# Patient Record
Sex: MALE | Race: OTHER | NOT HISPANIC OR LATINO
[De-identification: names, ages, dates, MRNs, and addresses within clinical notes are randomized per-mention and may not be internally consistent; named-entity substitution may affect disease eponyms.]

---

## 2022-04-07 DIAGNOSIS — Z72.89 OTHER PROBLEMS RELATED TO LIFESTYLE: ICD-10-CM

## 2022-04-07 DIAGNOSIS — Z87.891 PERSONAL HISTORY OF NICOTINE DEPENDENCE: ICD-10-CM

## 2022-04-07 DIAGNOSIS — J45.909 UNSPECIFIED ASTHMA, UNCOMPLICATED: ICD-10-CM

## 2022-04-07 DIAGNOSIS — Z82.49 FAMILY HISTORY OF ISCHEMIC HEART DISEASE AND OTHER DISEASES OF THE CIRCULATORY SYSTEM: ICD-10-CM

## 2022-04-07 PROBLEM — Z00.00 ENCOUNTER FOR PREVENTIVE HEALTH EXAMINATION: Status: ACTIVE | Noted: 2022-04-07

## 2022-04-07 RX ORDER — ALBUTEROL SULFATE 90 UG/1
108 (90 BASE) AEROSOL, METERED RESPIRATORY (INHALATION)
Refills: 0 | Status: ACTIVE | COMMUNITY

## 2022-04-18 ENCOUNTER — NON-APPOINTMENT (OUTPATIENT)
Age: 52
End: 2022-04-18

## 2022-04-18 ENCOUNTER — APPOINTMENT (OUTPATIENT)
Dept: UROLOGY | Facility: CLINIC | Age: 52
End: 2022-04-18
Payer: COMMERCIAL

## 2022-04-18 PROCEDURE — 93980 PENILE VASCULAR STUDY: CPT

## 2022-04-18 PROCEDURE — 54235 NJX CORPORA CAVERNOSA RX AGT: CPT

## 2022-05-02 ENCOUNTER — APPOINTMENT (OUTPATIENT)
Dept: UROLOGY | Facility: CLINIC | Age: 52
End: 2022-05-02

## 2022-05-16 ENCOUNTER — APPOINTMENT (OUTPATIENT)
Dept: UROLOGY | Facility: CLINIC | Age: 52
End: 2022-05-16
Payer: COMMERCIAL

## 2022-05-16 VITALS
SYSTOLIC BLOOD PRESSURE: 150 MMHG | BODY MASS INDEX: 29.4 KG/M2 | TEMPERATURE: 98 F | HEIGHT: 71 IN | DIASTOLIC BLOOD PRESSURE: 98 MMHG | WEIGHT: 210 LBS

## 2022-05-16 PROCEDURE — 99213 OFFICE O/P EST LOW 20 MIN: CPT

## 2022-05-16 NOTE — ASSESSMENT
[FreeTextEntry1] : The patient is not interested in penile injection therapy.  He has now failed maximum dose oral therapy.  At this point he request to have a penile implant.  This is indeed his best option however it will require grafting with arthroflex.\par \par The summary points of our discussion after the Duplex test are that:\par 1) The results of the Duplex study were reviewed with the patient and that his ailment is organic erectile dysfunction, refractory to other treatments, or at least other options were offered but rejected.\par 2) The proposed treatment is the inflatable, permanent, penile prosthesis that we have discussed in great detail at outlined in our previous discussion.\par 3) The probability of success is over 90%, but this depends on whether or not there are complications.  The complications can be very serious and certainly can occur, including infection, breakage, and injury to surrounding structures.  These events would quality as a "failure", and he understands this completely.\par 4) The risks are outlined above, but infection is 1-2% in the best circumstances, the chance of other events happening is low but possible, including urethral perforation, bladder perforation, or device breakage.\par With all of this stated, he decided in light of all of this discussion and different treatment options available, he would like to move forward with 3 piece inflatable penile prosthesis placement.  He was informed that penile implant surgery is an end of the line therapy, and once this is undertaken, one cannot go back and attempt to use injections or pills and expect these to work should the implant be removed for infection or fail to be satisfactory. He understands that he will need to be medically cleared before proceeding with scheduling a dated for the procedure.\par \par \par \par

## 2022-05-16 NOTE — HISTORY OF PRESENT ILLNESS
[FreeTextEntry1] : The patient is back to report on a trial of daily tadalafil.  He was not satisfied with the result.  He continues to complain of the pronounced curvature.  Penile duplex ultrasonography in the past revealed that he has a 45 degree curvature as well as a downward bend of the distal half of the penile shaft.  He also has an hourglass deformity.

## 2022-07-21 ENCOUNTER — APPOINTMENT (OUTPATIENT)
Dept: UROLOGY | Facility: CLINIC | Age: 52
End: 2022-07-21

## 2022-07-21 VITALS
SYSTOLIC BLOOD PRESSURE: 142 MMHG | BODY MASS INDEX: 28.44 KG/M2 | WEIGHT: 203.13 LBS | TEMPERATURE: 97.9 F | HEIGHT: 71 IN | DIASTOLIC BLOOD PRESSURE: 84 MMHG

## 2022-07-21 DIAGNOSIS — N40.1 BENIGN PROSTATIC HYPERPLASIA WITH LOWER URINARY TRACT SYMPMS: ICD-10-CM

## 2022-07-21 DIAGNOSIS — R39.14 BENIGN PROSTATIC HYPERPLASIA WITH LOWER URINARY TRACT SYMPMS: ICD-10-CM

## 2022-07-21 DIAGNOSIS — N48.89 OTHER SPECIFIED DISORDERS OF PENIS: ICD-10-CM

## 2022-07-21 PROCEDURE — 99215 OFFICE O/P EST HI 40 MIN: CPT | Mod: 25

## 2022-07-21 PROCEDURE — 51741 ELECTRO-UROFLOWMETRY FIRST: CPT

## 2022-07-21 PROCEDURE — 52000 CYSTOURETHROSCOPY: CPT

## 2022-07-21 PROCEDURE — 51725 SIMPLE CYSTOMETROGRAM: CPT

## 2022-07-21 PROCEDURE — 51798 US URINE CAPACITY MEASURE: CPT | Mod: 59

## 2022-07-21 NOTE — ASSESSMENT
[FreeTextEntry1] : CYSTOMETROGRAM:\par \par Preop Diagnosis: Increased Frequency of urination. \par Postop Diagnosis: Increased Frequency of urination.\par Anesthesia: 2 % Intraurethral Lidocaine Jelly \par Medication: Ciprofloxacin\par Complications: None \par Operative Note: \par - The patient was placed in the dorsal lithotomy position and draped in the usual standard sterile fashion with surgical Betadine soap and wash. 2 % lidocaine jelly was inserted intraurethrally via the meatus. A clamp was applied to the penis and lidocaine jelly was allowed to remain in place for 5 minutes. \par \par The meatus was then intubated with a 16F flexible cystocope. Visual cystourethroscopic examination was initiated under sterile water irrigation. The findings observed are described in the cryoscopy report. \par \par With the flexible cystocope indwelling into the bladder, a sterile line of intravenous saline was connected into a manometer. This allowed us to measure the amount of fluid, pressure, and flow of the lower urinary tract. The following findings of the cystometrogram were noted. \par \par Bladder wall compliance: Normal\par Functional bladder capacity:    \par The patient perceived a first sensation that the bladder was filling with saline at:     cc\par His first urge to void was observed at    cc of saline. \par His post void residual was measured at     cc\par Bladder Scan and his flow rate peaked at 48 cc/min \par His proprioception was normal: normal \par Detrusor instability was: Not present \par Summary: \par The patient tolerated the procedure well. He was discharged to home on oral antibiotics to call if he develop difficulty with urination, bleeding, fever or chills.\par \par BLADDER SCAN:\par Indication: Increased frequency of urination. \par Initial Volume:    cc\par PVR:   cc\par Results: Stress urinary incontinence. \par Recommendations: No Therapy Needed.\par \par URO FLOWMETRY:\par Indication: Increased frequency of urination. \par Urinary Flow Rate:    m/s\par Results: Stress urinary incontinence    \par Recommendations: No therapy needed.\par

## 2022-07-21 NOTE — HISTORY OF PRESENT ILLNESS
[FreeTextEntry1] : The patient is here for his preoperative visit prior to insertion of penile implant he is also here for evaluation of voiding function.  Patient complains of frequency of urination.

## 2022-08-15 RX ORDER — GENTAMICIN SULFATE 40 MG/ML
320 VIAL (ML) INJECTION ONCE
Refills: 0 | Status: DISCONTINUED | OUTPATIENT
Start: 2022-08-16 | End: 2022-08-16

## 2022-08-15 RX ORDER — SODIUM CHLORIDE 9 MG/ML
1000 INJECTION, SOLUTION INTRAVENOUS
Refills: 0 | Status: DISCONTINUED | OUTPATIENT
Start: 2022-08-15 | End: 2022-08-16

## 2022-08-15 RX ORDER — CHLORHEXIDINE GLUCONATE 213 G/1000ML
1 SOLUTION TOPICAL DAILY
Refills: 0 | Status: DISCONTINUED | OUTPATIENT
Start: 2022-08-16 | End: 2022-08-16

## 2022-08-15 NOTE — ASU PATIENT PROFILE, ADULT - NSICDXPASTMEDICALHX_GEN_ALL_CORE_FT
PAST MEDICAL HISTORY:  Asthma     H/O chest pain more than a year ago    HTN (hypertension)      PAST MEDICAL HISTORY:  Asthma     H/O chest pain more than a year ago    HTN (hypertension)     Hyperlipidemia

## 2022-08-15 NOTE — ASU PATIENT PROFILE, ADULT - FALL HARM RISK - UNIVERSAL INTERVENTIONS
Bed in lowest position, wheels locked, appropriate side rails in place/Call bell, personal items and telephone in reach/Instruct patient to call for assistance before getting out of bed or chair/Non-slip footwear when patient is out of bed/[place] to call system/Physically safe environment - no spills, clutter or unnecessary equipment/Purposeful Proactive Rounding/Room/bathroom lighting operational, light cord in reach

## 2022-08-16 ENCOUNTER — OUTPATIENT (OUTPATIENT)
Dept: OUTPATIENT SERVICES | Facility: HOSPITAL | Age: 52
LOS: 1 days | Discharge: HOME | End: 2022-08-16

## 2022-08-16 ENCOUNTER — TRANSCRIPTION ENCOUNTER (OUTPATIENT)
Age: 52
End: 2022-08-16

## 2022-08-16 ENCOUNTER — APPOINTMENT (OUTPATIENT)
Dept: UROLOGY | Facility: AMBULATORY SURGERY CENTER | Age: 52
End: 2022-08-16

## 2022-08-16 VITALS
WEIGHT: 207.23 LBS | HEART RATE: 87 BPM | HEIGHT: 71 IN | SYSTOLIC BLOOD PRESSURE: 148 MMHG | OXYGEN SATURATION: 98 % | RESPIRATION RATE: 15 BRPM | TEMPERATURE: 99 F | DIASTOLIC BLOOD PRESSURE: 97 MMHG

## 2022-08-16 VITALS
RESPIRATION RATE: 15 BRPM | DIASTOLIC BLOOD PRESSURE: 92 MMHG | SYSTOLIC BLOOD PRESSURE: 130 MMHG | OXYGEN SATURATION: 98 % | HEART RATE: 79 BPM | TEMPERATURE: 97 F

## 2022-08-16 DIAGNOSIS — Z90.49 ACQUIRED ABSENCE OF OTHER SPECIFIED PARTS OF DIGESTIVE TRACT: Chronic | ICD-10-CM

## 2022-08-16 PROCEDURE — 11952 SUBQ NJX FIL MATRL 5.1-10CC: CPT | Mod: GC

## 2022-08-16 PROCEDURE — 11952 SUBQ NJX FIL MATRL 5.1-10CC: CPT | Mod: AS

## 2022-08-16 PROCEDURE — 54360 PENIS PLASTIC SURGERY: CPT | Mod: GC

## 2022-08-16 PROCEDURE — 15574 PEDCLE FH/CH/CH/M/N/AX/G/H/F: CPT | Mod: AS

## 2022-08-16 PROCEDURE — 54405 INSERT MULTI-COMP PENIS PROS: CPT | Mod: GC

## 2022-08-16 PROCEDURE — 15574 PEDCLE FH/CH/CH/M/N/AX/G/H/F: CPT | Mod: GC

## 2022-08-16 PROCEDURE — 54360 PENIS PLASTIC SURGERY: CPT | Mod: AS

## 2022-08-16 PROCEDURE — 15275 SKIN SUB GRAFT FACE/NK/HF/G: CPT | Mod: AS

## 2022-08-16 PROCEDURE — 15275 SKIN SUB GRAFT FACE/NK/HF/G: CPT | Mod: GC

## 2022-08-16 PROCEDURE — 54405 INSERT MULTI-COMP PENIS PROS: CPT | Mod: AS

## 2022-08-16 DEVICE — SURGIFLO HEMOSTATIC MATRIX KIT: Type: IMPLANTABLE DEVICE | Status: FUNCTIONAL

## 2022-08-16 DEVICE — RESERVIOR TITAN CLOVERLEAF 125CC: Type: IMPLANTABLE DEVICE | Status: FUNCTIONAL

## 2022-08-16 DEVICE — KIT PENILE TITAN ASSEMBLY STANDARD: Type: IMPLANTABLE DEVICE | Status: FUNCTIONAL

## 2022-08-16 DEVICE — IMP PENILE TITAN CYL PUMP SET SCROTAL 0 ANG 22CM: Type: IMPLANTABLE DEVICE | Status: FUNCTIONAL

## 2022-08-16 RX ORDER — AMLODIPINE BESYLATE AND BENAZEPRIL HYDROCHLORIDE 10; 20 MG/1; MG/1
1 CAPSULE ORAL
Qty: 0 | Refills: 0 | DISCHARGE

## 2022-08-16 RX ORDER — ONDANSETRON 8 MG/1
4 TABLET, FILM COATED ORAL ONCE
Refills: 0 | Status: DISCONTINUED | OUTPATIENT
Start: 2022-08-16 | End: 2022-08-16

## 2022-08-16 RX ORDER — ACETAMINOPHEN 500 MG
650 TABLET ORAL ONCE
Refills: 0 | Status: DISCONTINUED | OUTPATIENT
Start: 2022-08-16 | End: 2022-08-16

## 2022-08-16 RX ORDER — OXYCODONE HYDROCHLORIDE 5 MG/1
5 TABLET ORAL ONCE
Refills: 0 | Status: DISCONTINUED | OUTPATIENT
Start: 2022-08-16 | End: 2022-08-16

## 2022-08-16 RX ORDER — FENTANYL CITRATE 50 UG/ML
50 INJECTION INTRAVENOUS DAILY
Refills: 0 | Status: DISCONTINUED | OUTPATIENT
Start: 2022-08-16 | End: 2022-08-16

## 2022-08-16 RX ORDER — ALBUTEROL 90 UG/1
0 AEROSOL, METERED ORAL
Qty: 0 | Refills: 0 | DISCHARGE

## 2022-08-16 RX ORDER — VANCOMYCIN HCL 1 G
1500 VIAL (EA) INTRAVENOUS ONCE
Refills: 0 | Status: COMPLETED | OUTPATIENT
Start: 2022-08-16 | End: 2022-08-16

## 2022-08-16 RX ADMIN — SODIUM CHLORIDE 200 MILLILITER(S): 9 INJECTION, SOLUTION INTRAVENOUS at 07:03

## 2022-08-16 RX ADMIN — CHLORHEXIDINE GLUCONATE 1 APPLICATION(S): 213 SOLUTION TOPICAL at 06:49

## 2022-08-16 RX ADMIN — OXYCODONE HYDROCHLORIDE 5 MILLIGRAM(S): 5 TABLET ORAL at 17:06

## 2022-08-16 RX ADMIN — SODIUM CHLORIDE 200 MILLILITER(S): 9 INJECTION, SOLUTION INTRAVENOUS at 12:32

## 2022-08-16 RX ADMIN — OXYCODONE HYDROCHLORIDE 5 MILLIGRAM(S): 5 TABLET ORAL at 14:05

## 2022-08-16 RX ADMIN — OXYCODONE HYDROCHLORIDE 5 MILLIGRAM(S): 5 TABLET ORAL at 17:36

## 2022-08-16 RX ADMIN — FENTANYL CITRATE 50 MICROGRAM(S): 50 INJECTION INTRAVENOUS at 12:30

## 2022-08-16 RX ADMIN — FENTANYL CITRATE 50 MICROGRAM(S): 50 INJECTION INTRAVENOUS at 12:45

## 2022-08-16 RX ADMIN — OXYCODONE HYDROCHLORIDE 5 MILLIGRAM(S): 5 TABLET ORAL at 13:35

## 2022-08-16 RX ADMIN — Medication 150 MILLIGRAM(S): at 07:12

## 2022-08-16 NOTE — BRIEF OPERATIVE NOTE - NSICDXBRIEFPREOP_GEN_ALL_CORE_FT
PRE-OP DIAGNOSIS:  Erectile dysfunction 16-Aug-2022 12:25:57  Anthony Hummel  Penile pain 16-Aug-2022 12:26:15  Anthony Hummel  Hypertension 16-Aug-2022 12:26:23  Anthony Hummel

## 2022-08-16 NOTE — ASU DISCHARGE PLAN (ADULT/PEDIATRIC) - CARE PROVIDER_API CALL
Hao Giron)  Urology  [address] [place] [address]  Phone: [phone]  Fax: [phone]  Follow Up Time:

## 2022-08-16 NOTE — ASU DISCHARGE PLAN (ADULT/PEDIATRIC) - ASU DC SPECIAL INSTRUCTIONSFT
Strict bed rest for 48 hours.  Apply ice packs to surgical region.  Follow up with Dr. Giron on Monday.  See attached pre-printed instructions.

## 2022-08-16 NOTE — BRIEF OPERATIVE NOTE - NSICDXBRIEFPROCEDURE_GEN_ALL_CORE_FT
PROCEDURES:  Insertion, penile prosthesis, inflatable, multicomponent 16-Aug-2022 12:40:02  Anthony Hummel  Correction of malrotation of penis 16-Aug-2022 12:40:19  Anthony Hummel

## 2022-08-16 NOTE — BRIEF OPERATIVE NOTE - NSICDXBRIEFPOSTOP_GEN_ALL_CORE_FT
POST-OP DIAGNOSIS:  Erectile dysfunction 16-Aug-2022 12:26:37  Anthony Hummel  Penile pain 16-Aug-2022 12:26:46  Anthony Hummel  Hypertension 16-Aug-2022 12:26:58  Anthony Hummel

## 2022-08-18 ENCOUNTER — NON-APPOINTMENT (OUTPATIENT)
Age: 52
End: 2022-08-18

## 2022-08-22 PROBLEM — J45.909 UNSPECIFIED ASTHMA, UNCOMPLICATED: Chronic | Status: ACTIVE | Noted: 2022-08-15

## 2022-08-22 PROBLEM — E78.5 HYPERLIPIDEMIA, UNSPECIFIED: Chronic | Status: ACTIVE | Noted: 2022-08-16

## 2022-08-22 PROBLEM — I10 ESSENTIAL (PRIMARY) HYPERTENSION: Chronic | Status: ACTIVE | Noted: 2022-08-15

## 2022-08-22 PROBLEM — Z87.898 PERSONAL HISTORY OF OTHER SPECIFIED CONDITIONS: Chronic | Status: ACTIVE | Noted: 2022-08-15

## 2022-09-12 ENCOUNTER — APPOINTMENT (OUTPATIENT)
Dept: UROLOGY | Facility: CLINIC | Age: 52
End: 2022-09-12

## 2022-09-12 VITALS
DIASTOLIC BLOOD PRESSURE: 80 MMHG | SYSTOLIC BLOOD PRESSURE: 126 MMHG | TEMPERATURE: 96.8 F | BODY MASS INDEX: 29.4 KG/M2 | WEIGHT: 210 LBS | HEIGHT: 71 IN

## 2022-09-12 DIAGNOSIS — N48.89 OTHER SPECIFIED DISORDERS OF PENIS: ICD-10-CM

## 2022-09-12 DIAGNOSIS — Z01.818 ENCOUNTER FOR OTHER PREPROCEDURAL EXAMINATION: ICD-10-CM

## 2022-09-12 PROCEDURE — 99024 POSTOP FOLLOW-UP VISIT: CPT

## 2022-09-12 PROCEDURE — 51798 US URINE CAPACITY MEASURE: CPT

## 2022-09-12 NOTE — ASSESSMENT
[FreeTextEntry1] : BLADDER SCAN:\par Indication: Increased frequency of urination. \par Initial Volume:    cc\par PVR: 95  cc\par Results: Stress urinary incontinence. \par Recommendations: No Therapy Needed.\par \par SUTURE REMOVAL NOTE:\par Incision: Healing well, edges well approximated, no redness, swelling or drainage present. \par Drainage: No drainage present\par Number of sutures removed: 4\par Site appearance post procedure: clean intact\par Site care post procedure: site cleansed with alcohol swap with steri strips applied with edges approximated. \par Patient respond to procedure tolerated well without complication. \par Recommendation/ patient instructions: Follow up appointment. \par \par The patient is 30 days following insertion of of inflatable penile implant.  He is doing well.  He was provided today with appropriate postop instructions.  A 4 weeks postop assessment was discussed with the patient and he was provided with further information on how to inflate and deflate his device.  He was also provided with a brochure of the appropriate manufacture as well as a model of the scrotal pump.\par \par \par

## 2022-09-12 NOTE — HISTORY OF PRESENT ILLNESS
[FreeTextEntry1] : The patient is almost 4 weeks status post insertion of penile implant with correction of pedal angulation and grafting.  He has no complaints.

## 2022-12-05 ENCOUNTER — APPOINTMENT (OUTPATIENT)
Dept: UROLOGY | Facility: CLINIC | Age: 52
End: 2022-12-05

## 2022-12-05 VITALS
HEIGHT: 71 IN | TEMPERATURE: 98 F | DIASTOLIC BLOOD PRESSURE: 100 MMHG | BODY MASS INDEX: 29.4 KG/M2 | WEIGHT: 210 LBS | SYSTOLIC BLOOD PRESSURE: 180 MMHG

## 2022-12-05 DIAGNOSIS — N52.9 MALE ERECTILE DYSFUNCTION, UNSPECIFIED: ICD-10-CM

## 2022-12-05 DIAGNOSIS — Z48.816 ENCOUNTER FOR SURGICAL AFTERCARE FOLLOWING SURGERY ON THE GENITOURINARY SYSTEM: ICD-10-CM

## 2022-12-05 DIAGNOSIS — N48.89 OTHER SPECIFIED DISORDERS OF PENIS: ICD-10-CM

## 2022-12-05 DIAGNOSIS — I10 ESSENTIAL (PRIMARY) HYPERTENSION: ICD-10-CM

## 2022-12-05 DIAGNOSIS — N48.6 INDURATION PENIS PLASTICA: ICD-10-CM

## 2022-12-05 PROCEDURE — 51798 US URINE CAPACITY MEASURE: CPT

## 2022-12-05 PROCEDURE — 99214 OFFICE O/P EST MOD 30 MIN: CPT | Mod: 25

## 2022-12-05 NOTE — HISTORY OF PRESENT ILLNESS
[FreeTextEntry1] : 52M presenting status post insertion of penile implant with correction of penile angulation and grafting 8/16/22\par \par  He has no complaints currently. Doing well.\par Able to inflate and deflate the device without difficulty.\par Notes some outpouching from the proximal right shaft. Does not bother him or his partner.
None

## 2023-06-05 ENCOUNTER — APPOINTMENT (OUTPATIENT)
Dept: UROLOGY | Facility: CLINIC | Age: 53
End: 2023-06-05

## 2023-07-31 ENCOUNTER — APPOINTMENT (OUTPATIENT)
Dept: UROLOGY | Facility: CLINIC | Age: 53
End: 2023-07-31
Payer: COMMERCIAL

## 2023-07-31 VITALS
BODY MASS INDEX: 28.99 KG/M2 | TEMPERATURE: 98.3 F | SYSTOLIC BLOOD PRESSURE: 134 MMHG | HEIGHT: 72 IN | DIASTOLIC BLOOD PRESSURE: 88 MMHG | WEIGHT: 214 LBS

## 2023-07-31 DIAGNOSIS — Z96.0 PRESENCE OF UROGENITAL IMPLANTS: ICD-10-CM

## 2023-07-31 DIAGNOSIS — R35.0 FREQUENCY OF MICTURITION: ICD-10-CM

## 2023-07-31 PROCEDURE — 99213 OFFICE O/P EST LOW 20 MIN: CPT

## 2023-07-31 RX ORDER — PHENAZOPYRIDINE HCL 99.5 MG
TABLET ORAL
Refills: 0 | Status: ACTIVE | COMMUNITY

## 2023-07-31 RX ORDER — CIPROFLOXACIN HYDROCHLORIDE 500 MG/1
500 TABLET, FILM COATED ORAL
Qty: 28 | Refills: 0 | Status: ACTIVE | COMMUNITY
Start: 2023-07-31 | End: 1900-01-01

## 2023-07-31 RX ORDER — PUMPKIN SEED EXTRACT/SOY GERM 300 MG
CAPSULE ORAL
Refills: 0 | Status: ACTIVE | COMMUNITY

## 2023-08-02 PROBLEM — R35.0 FREQUENCY OF URINATION: Status: ACTIVE | Noted: 2022-07-21

## 2023-08-02 PROBLEM — Z96.0 S/P INSERTION OF PENILE IMPLANT: Status: ACTIVE | Noted: 2022-09-12

## 2023-08-02 NOTE — PHYSICAL EXAM
[General Appearance - Well Developed] : well developed [General Appearance - Well Nourished] : well nourished [Normal Appearance] : normal appearance [Well Groomed] : well groomed [General Appearance - In No Acute Distress] : no acute distress [Abdomen Tenderness] : non-tender [Abdomen Soft] : soft [Costovertebral Angle Tenderness] : no ~M costovertebral angle tenderness [Urethral Meatus] : meatus normal [Urinary Bladder Findings] : the bladder was normal on palpation [Scrotum] : the scrotum was normal [Testes Mass (___cm)] : there were no testicular masses [No Prostate Nodules] : no prostate nodules [FreeTextEntry1] : The patient is status post insertion of inflatable multicomponent inflatable penile prosthesis.   The overall the the overall appearance is excellent there is no edema or swelling.    There is no erythema.  The incision is well-healed the edges are well approximated there is no discharge.   Cylinders sizing is excellent.  The distal tips are well positioned in the mid glans.   The appearance of the cylinders deflated and the shaft of the penis flaccid is also good.   Scrotal skin is intact the location of the the pump is excellent it is in in in the back behind the testicles its accessible yet discrete and well concealed.   There is no tethering of the pump.   The reservoir in the lower quadrant is nonpalpable. [] : no rash

## 2023-08-02 NOTE — PLAN
[TextEntry] : The patient was empirically being treated for diagnosis of prostatitis.  A urine analysis and culture was requested prior to starting ciprofloxacin.

## 2023-08-02 NOTE — ASSESSMENT
[FreeTextEntry1] : The patient was not able to urinate.  We therefore did not have information of his flow pattern and his postvoid residual.

## 2023-08-02 NOTE — HISTORY OF PRESENT ILLNESS
[FreeTextEntry1] : Patient chief complaint is 30-day history of frequency of urination.  He also reports decreased force of stream.  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  This is acute in onset and he noticed it over the last months.  He denies fever chills or burning on urination

## 2023-09-25 ENCOUNTER — APPOINTMENT (OUTPATIENT)
Dept: UROLOGY | Facility: CLINIC | Age: 53
End: 2023-09-25

## (undated) DEVICE — LONE STAR DILAMEZINSERT INSERTER BLUE 12MM

## (undated) DEVICE — GLV 9 PROTEXIS (WHITE)

## (undated) DEVICE — SUT PROLENE 4-0 14" V-47

## (undated) DEVICE — SUT PDS II 3-0 27" RB-1

## (undated) DEVICE — LONE STAR ELASTIC STAY HOOK 12MM BLUNT

## (undated) DEVICE — PREP SCRUB BRUSH W CHG 4%

## (undated) DEVICE — DRSG TAPE UMBILICAL COTTON 2" X 30 X 1/8"

## (undated) DEVICE — WARMING BLANKET UPPER ADULT

## (undated) DEVICE — MARKING PEN DEVON DUAL TIP W RULER

## (undated) DEVICE — SUPP ATHLETIC MALE XLG 44-55IN

## (undated) DEVICE — SLV COMPRESSION KNEE MED

## (undated) DEVICE — SUT VICRYL 3-0 27" RB-1 UNDYED

## (undated) DEVICE — PREP CHLORAPREP HI-LITE ORANGE 26ML

## (undated) DEVICE — DRAIN URINARY LEG BAG WITH FLIP-FLO VALVE 32OZ

## (undated) DEVICE — DRSG COMBINE 5X9"

## (undated) DEVICE — BAG SPONGE COUNTER EZ

## (undated) DEVICE — PACK PROST PENILE LNX SURGICOUNT

## (undated) DEVICE — DRSG DERMABOND 0.7ML

## (undated) DEVICE — BAG DECANTER IV STERILE